# Patient Record
(demographics unavailable — no encounter records)

---

## 2024-10-08 NOTE — HISTORY OF PRESENT ILLNESS
[de-identified] : 52-year-old RHD female presents for a follow-up of bilateral shoulder pain, right significantly worse than left. She had a left shoulder cortisone injection on 3/28/2024. She notes the left shoulder is functioning. She notes her left shoulder forward flexion is significantly better. She was told her left shoulder is about 90-95% better at this point. Patient denies that she has any numbness or tingling. She continues to have limited forward flexion, internal rotation and external rotation of her right shoulder.   Radiology Results 3/28/2024 o Right Shoulder : AP internal/external rotation and outlet views were obtained and revealed no significant degenerative arthritis no lytic lesions of the right shoulder  o Left Shoulder : AP internal/external rotation and outlet views were obtained and revealed no significant degenerative arthritis no lytic lesions of the left shoulder

## 2024-10-08 NOTE — DISCUSSION/SUMMARY
[de-identified] : I went over the pathophysiology of the patient's symptoms in great detail with the patient. I am recommending the patient continues to go to physical therapy to obtain increases in their strength and mobility. A prescription was provided. At-home strengthening exercises were discussed and demonstrated with the patient.  The patient was instructed in ROM exercises they are to do at home. We discussed the use of ice, Tylenol and anti-inflammatories to relieve pain.   All of their questions were answered. They understand and consent to the plan.   FU in 6 weeks.

## 2024-10-08 NOTE — PHYSICAL EXAM
[de-identified] : Constitutional o Appearance : well-nourished, well developed, alert, in no acute distress  Head and Face o Head :  Inspection : atraumatic, normocephalic o Face :  Inspection : no visible rash or discoloration Respiratory o Respiratory Effort: breathing unlabored  Neurologic o Sensation : Normal sensation  Psychiatric o Mood and Affect: mood normal, affect appropriate  Lymphatic o Additional Nodes : No palpable lymph nodes present   Cervical Spine o Inspection/Palpation :  Inspection : alignment midline, normal degree of lordosis present  Skin : normal appearance, no masses or tenderness, trachea midline  Palpation : musculature is nontender to palpation o Range of Motion: slight limited rotation to the left,  o Tests: Negative Spurlings test  Right Upper Extremity o Right Shoulder :  Inspection/Palpation : min anterior capsular  tenderness, no swelling or deformities  Range of Motion : forward flexion to 120, ER to 20 degrees, abduction 85 degrees, IR to the posterior spine no crepitance,   Strength : forward elevation 4+/5, IR 4/5, ER 4/5, ER at 90 of abduction 4/5, supraspinatus 4/5, adduction 5/5, abduction 5/5, biceps/triceps 5/5,  5/5  Stability : no joint instability on provocative testing   Tests: Perez negative, Neer negative, Nic negative, drop arm test negative, Fleming's test negative  Left Upper Extremity o Left Shoulder :  Inspection/Palpation : mild anterior capsular tenderness, no swelling or deformities  Range of Motion : limited rotation, forward flexion symmetrical, full ER  no crepitance  Strength : forward elevation 5/5, IR 5/5, ER 5/5, ER at 90 of abduction 5/5, supraspinatus 5/5, adduction 5/5, abduction 5/5, biceps/triceps 5/5,  5/5  Stability : no joint instability on provocative testing   Tests: Perez negative, Neer negative, Nic negative, drop arm test negative, Fleming's test negative  Gait and Station: Gait: gait normal, no significant extremity swelling or lymphedema, good proprioception and balance

## 2024-10-08 NOTE — ADDENDUM
[FreeTextEntry1] : I, YARELY STANFORD, acted solely as a scribe for Dr. Enrique Cook on this date 10/08/2024.  All medical record entries made by the Scribe were at my, Dr. Enrique Cook, direction and personally dictated by me on 10/08/2024. I have reviewed the chart and agree that the record accurately reflects my personal performance of the history, physical exam, assessment and plan. I have also personally directed, reviewed, and agreed with the chart.

## 2024-11-19 NOTE — HISTORY OF PRESENT ILLNESS
[de-identified] : 52-year-old RHD female presents for a follow-up of bilateral shoulder pain, right worse than left. She had a left shoulder cortisone injection on 3/28/2024. She notes the left shoulder is functioning. She notes her left shoulder forward flexion is significantly better. She has significant improved range of motion of her left shoulder which is pain free. She was told her left shoulder is about 90-95% better at this point. Patient denies that she has any numbness or tingling. She continues to have limited forward flexion, internal rotation and external rotation of her right shoulder and is not sure if she can do physical therapy at home at this point. She states she uses the pulley at home.   Radiology Results 3/28/2024 o Right Shoulder : AP internal/external rotation and outlet views were obtained and revealed no significant degenerative arthritis no lytic lesions of the right shoulder  o Left Shoulder : AP internal/external rotation and outlet views were obtained and revealed no significant degenerative arthritis no lytic lesions of the left shoulder

## 2024-11-19 NOTE — ADDENDUM
[FreeTextEntry1] : I, YARELY STANFORD, acted solely as a scribe for Dr. Enrique Cook on this date 11/19/2024.  All medical record entries made by the Scribe were at my, Dr. Enrique Cook, direction and personally dictated by me on 11/19/2024. I have reviewed the chart and agree that the record accurately reflects my personal performance of the history, physical exam, assessment and plan. I have also personally directed, reviewed, and agreed with the chart.

## 2024-11-19 NOTE — DISCUSSION/SUMMARY
[de-identified] : I went over the pathophysiology of the patient's symptoms in great detail with the patient. I am recommending the patient continues to do physical therapy at home to obtain increases in their strength and mobility. A prescription was provided. At-home strengthening exercises were discussed and demonstrated with the patient.  The patient was instructed in ROM exercises they are to do at home. We discussed the use of ice, Tylenol and anti-inflammatories to relieve pain. She should continue to use an over-the-door pulley.   All of their questions were answered. They understand and consent to the plan.   FU 4-6 weeks for re evaluation

## 2024-11-19 NOTE — PHYSICAL EXAM
[de-identified] : Constitutional o Appearance : well-nourished, well developed, alert, in no acute distress  Head and Face o Head :  Inspection : atraumatic, normocephalic o Face :  Inspection : no visible rash or discoloration Respiratory o Respiratory Effort: breathing unlabored  Neurologic o Sensation : Normal sensation  Psychiatric o Mood and Affect: mood normal, affect appropriate  Lymphatic o Additional Nodes : No palpable lymph nodes present   Cervical Spine o Inspection/Palpation :  Inspection : alignment midline, normal degree of lordosis present  Skin : normal appearance, no masses or tenderness, trachea midline  Palpation : musculature is nontender to palpation o Range of Motion: slight limited rotation to the left,  o Tests: Negative Spurlings test  Right Upper Extremity o Right Shoulder :  Inspection/Palpation : no anterior capsular  tenderness, no swelling or deformities  Range of Motion : forward flexion to 120, lacks 15 degrees, ER full, abduction 85 degrees, IR passive motion, abduction is 90 degrees,  no crepitance,   Strength : forward elevation 5/5, IR 5/5, ER 4/5, ER at 90 of abduction 5/5, supraspinatus 5/5, adduction 5/5, abduction 5/5, biceps/triceps 5/5,  5/5  Stability : no joint instability on provocative testing   Tests: Perez negative, Neer negative, Nic negative, drop arm test negative, [place]'s test negative  Left Upper Extremity o Left Shoulder :  Inspection/Palpation : mild anterior capsular tenderness, no swelling or deformities  Range of Motion : full forward flexion full ER and IR no crepitance  Strength : forward elevation 5/5, IR 5/5, ER 5/5, ER at 90 of abduction 5/5, supraspinatus 5/5, adduction 5/5, abduction 5/5, biceps/triceps 5/5,  5/5  Stability : no joint instability on provocative testing   Tests: Perez negative, Neer negative, Nic negative, drop arm test negative, [place]'s test negative  Gait and Station: Gait: gait normal, no significant extremity swelling or lymphedema, good proprioception and balance

## 2024-12-30 NOTE — ADDENDUM
[FreeTextEntry1] : I, YARELY STANFORD, acted solely as a scribe for Dr. Enrique Cook on this date 12/30/2024.  All medical record entries made by the Scribe were at my, Dr. Enrique Cook, direction and personally dictated by me on 12/30/2024. I have reviewed the chart and agree that the record accurately reflects my personal performance of the history, physical exam, assessment and plan. I have also personally directed, reviewed, and agreed with the chart.

## 2024-12-30 NOTE — HISTORY OF PRESENT ILLNESS
[de-identified] : 52-year-old RHD female presents for a follow-up of bilateral shoulder pain, right worse than left. She had a left shoulder cortisone injection on 3/28/2024. She notes the left shoulder is functioning. She notes her left shoulder forward flexion is significantly better. She has significant improved range of motion of her left shoulder which is pain free. She was told her left shoulder is about 99% better at this point. Patient denies that she has any numbness or tingling. She continues to have limited forward flexion, internal rotation and external rotation of her right shoulder. She has finished with phyiscal therapy for the year. She states she uses the pulley at home.   Radiology Results 3/28/2024 o Right Shoulder : AP internal/external rotation and outlet views were obtained and revealed no significant degenerative arthritis no lytic lesions of the right shoulder  o Left Shoulder : AP internal/external rotation and outlet views were obtained and revealed no significant degenerative arthritis no lytic lesions of the left shoulder

## 2024-12-30 NOTE — DISCUSSION/SUMMARY
[de-identified] : I went over the pathophysiology of the patient's symptoms in great detail with the patient. I am recommending the patient continues to do physical therapy at home to obtain increases in their strength and mobility. A prescription was provided. At-home strengthening exercises were discussed and demonstrated with the patient.  The patient was instructed in ROM exercises they are to do at home. We discussed the use of ice, Tylenol and anti-inflammatories to relieve pain. She should continue to use an over-the-door pulley.   All of their questions were answered. They understand and consent to the plan.   FU 4-6 weeks.

## 2024-12-30 NOTE — PHYSICAL EXAM
[de-identified] : Constitutional o Appearance : well-nourished, well developed, alert, in no acute distress  Head and Face o Head :  Inspection : atraumatic, normocephalic o Face :  Inspection : no visible rash or discoloration Respiratory o Respiratory Effort: breathing unlabored  Neurologic o Sensation : Normal sensation  Psychiatric o Mood and Affect: mood normal, affect appropriate  Lymphatic o Additional Nodes : No palpable lymph nodes present   Cervical Spine o Inspection/Palpation :  Inspection : alignment midline, normal degree of lordosis present  Skin : normal appearance, no masses or tenderness, trachea midline  Palpation : musculature is nontender to palpation o Range of Motion: slight limited rotation to the left,  o Tests: Negative Spurlings test  Right Upper Extremity o Right Shoulder :  Inspection/Palpation : no anterior capsular  tenderness, no swelling or deformities  Range of Motion : forward flexion to 120, lacks 15 degrees, ER full, abduction 85 degrees, IR limited by 20 degrees, abduction is 90 degrees,  no crepitance,   Strength : forward elevation 5/5, IR 5/5, ER 5/5, ER at 90 of abduction 5/5, supraspinatus 5/5, adduction 5/5, abduction 5/5, biceps/triceps 5/5,  5/5  Stability : no joint instability on provocative testing   Tests: Perez negative, Neer negative, Nic negative, drop arm test negative, [place]'s test negative  Left Upper Extremity o Left Shoulder :  Inspection/Palpation : mild anterior capsular tenderness, no swelling or deformities  Range of Motion : full forward flexion full ER and IR no crepitance  Strength : forward elevation 5/5, IR 5/5, ER 5/5, ER at 90 of abduction 5/5, supraspinatus 5/5, adduction 5/5, abduction 5/5, biceps/triceps 5/5,  5/5  Stability : no joint instability on provocative testing   Tests: Perez negative, Neer negative, Nic negative, drop arm test negative, [place]'s test negative  Gait and Station: Gait: gait normal, no significant extremity swelling or lymphedema, good proprioception and balance

## 2025-02-07 NOTE — HISTORY OF PRESENT ILLNESS
[FreeTextEntry1] : 52 year old  female presents for annual. Last seen in May 2022. Had negative pap in 2021. Has 3 prior c/s. H/o endometrial polyp. Reports periods are irregular. Denies menopausal sxs. Notes her mother passed away in 2024 from lymphoma and also had a breast melanoma. Last mammo was in May 2024. Had colonoscopy in .  Her oldest daughter is applying to college this year  OBHx: c/s x3 GYNHx: h/o endometrial polyp  PSHx: c/s   FHx: lymphoma-mother, breast melanoma-mother  Allergies: Adhesive tape, NKDA [Mammogramdate] : 5/2024 [PapSmeardate] : 2/2021 [ColonoscopyDate] : 2022

## 2025-02-07 NOTE — PLAN
[FreeTextEntry1] :  52 year old  female presents for annual.  -Pap/HPV -Referred for mammo(due in May) -Colonoscopy UTD(due for repeat in ) -Discussed perimenopause  F/u in 1 yr.

## 2025-02-07 NOTE — END OF VISIT
[FreeTextEntry3] :  I, Ena Maldonado, acted as a scribe on behalf of Dr. Yady Caban M.D. on 02/07/2025.   All medical entries made by the scribe were at my, Dr. Yady Caban M.D., direction and personally dictated by me on 02/07/2025. I have reviewed the chart and agree that the record accurately reflects my personal performance of the history, physical exam, assessment and plan. I have also personally directed, reviewed, and agreed with the chart.

## 2025-02-10 NOTE — PHYSICAL EXAM
[de-identified] : Constitutional o Appearance : well-nourished, well developed, alert, in no acute distress  Head and Face o Head :  Inspection : atraumatic, normocephalic o Face :  Inspection : no visible rash or discoloration Respiratory o Respiratory Effort: breathing unlabored  Neurologic o Sensation : Normal sensation  Psychiatric o Mood and Affect: mood normal, affect appropriate  Lymphatic o Additional Nodes : No palpable lymph nodes present   Cervical Spine o Inspection/Palpation :  Inspection : alignment midline, normal degree of lordosis present  Skin : normal appearance, no masses or tenderness, trachea midline  Palpation : musculature is nontender to palpation o Range of Motion: slight limited rotation to the left,  o Tests: Negative Spurlings test  Right Upper Extremity o Right Shoulder :  Inspection/Palpation : no anterior capsular  tenderness, no swelling or deformities  Range of Motion : forward flexion to 120, lacks 5 degrees, ER full, abduction 85 degrees, IR limited by 20 degrees, abduction is 90 degrees,  no crepitance,  Lacks 5 degrees of  full forward flexion   Strength : forward elevation 5/5, IR 5/5, ER 5/5, ER at 90 of abduction 5/5, supraspinatus 5/5, adduction 5/5, abduction 5/5, biceps/triceps 5/5,  5/5  Stability : no joint instability on provocative testing   Tests: Perez negative, Neer negative, Nic negative, drop arm test negative, Rankin's test negative Cross chest adduction causes no pain   Left Upper Extremity o Left Shoulder :  Inspection/Palpation : mild anterior capsular tenderness, no swelling or deformities  Range of Motion : full forward flexion full ER and IR no crepitance. Full abduction   Strength : forward elevation 5/5, IR 5/5, ER 5/5, ER at 90 of abduction 5/5, supraspinatus 5/5, adduction 5/5, abduction 5/5, biceps/triceps 5/5,  5/5  Stability : no joint instability on provocative testing   Tests: Perez negative, Neer negative, Nic negative, drop arm test negative, Rankin's test negative Cross chest adduction causes no pain  Gait and Station: Gait: gait normal, no significant extremity swelling or lymphedema, good proprioception and balance

## 2025-02-10 NOTE — HISTORY OF PRESENT ILLNESS
[de-identified] : 52-year-old RHD female presents for a follow-up of bilateral shoulder pain, right worse than left. History of frozen shoulder. Last visit to [facility] therapy was last Weds. She had a left shoulder cortisone injection on 3/28/2024. She notes the left shoulder is functioning. She notes her left shoulder forward flexion is significantly better. She has significant improved range of motion of her left shoulder which is pain free. She was told her left shoulder is about 99% better at this point. Patient denies that she has any numbness or tingling. She continues to have limited forward flexion, internal rotation and external rotation of her right shoulder.   o Right Shoulder : AP internal/external rotation and outlet views were obtained and revealed no significant degenerative arthritis no lytic lesions of the right shoulder  o Left Shoulder : AP internal/external rotation and outlet views were obtained and revealed no significant degenerative arthritis no lytic lesions of the left shoulder  No

## 2025-02-10 NOTE — DISCUSSION/SUMMARY
[de-identified] :  The patient was instructed in ROM exercises they are to do at home. We discussed the use of ice, Tylenol and anti-inflammatories to relieve pain. She should continue to use an over-the-door pulley, forward flexion wall walks.   All of their questions were answered. They understand and consent to the plan.  Follow up as needed

## 2025-07-08 NOTE — PAST MEDICAL HISTORY
[FreeTextEntry1] : Reviewed.   PCP: Dariana Middleton ([facility])  Hx of TBI: denied Hx of Seizures: denied Hx of Migraine HA: denied   Relevant Labs/EKG: none in chart for review

## 2025-07-08 NOTE — HISTORY OF PRESENT ILLNESS
[FreeTextEntry1] : Refer to intake documentation from 6/5/25 for details.   [FreeTextEntry2] : Refer to intake documentation from 6/5/25 for details.   [FreeTextEntry3] : Current psych meds: --sertraline 150 mg daily   Past psych meds: --denied

## 2025-07-08 NOTE — PLAN
[FreeTextEntry5] : 1. Unspecified Depressive Disorder --Continue sertraline 150 mg daily  --Internal referral for psychotherapy  --Follow-up in 8 weeks or sooner PRN   -[place]  reviewed prior to all controlled substance prescriptions. - I have given my usual talk about the risks and benefits of all treatment recommendations including alternatives and the risks and benefits of no treatment at all. Patient had the opportunity to have all questions answered to their satisfaction. They expressed understanding and agreement with the above treatment plan. - Educated patient of importance of remaining abstinent from drugs and alcohol. - Discussed that unpredictable effects including cardiorespiratory collapse can result from the combination of illicit drugs and prescribed medications. Patient verbalized understanding. - Emergency procedures were discussed: pt. educated to call 911 or go to nearest ER for worsening of symptoms/suicidal/homicidal ideation. - Patient given opportunity to ask questions and their questions were answered and they expressed understanding and agreement with above plan.   I spent a total of 30 minutes for today's visit in evaluating and treating the patient as per above, including preparing for patient's appointment (review of prior documents, [facility] ), performing a medically necessary exam/evaluation, communicating/counseling/educating the patient ordering medications, documenting clinical information in the EHR.

## 2025-07-08 NOTE — DISCUSSION/SUMMARY
[FreeTextEntry1] : 6/5/25- Continue sertraline 150 mg daily. Follow-up in 4-6 weeks.  7/8/25- Continue sertraline 150 mg daily. Follow-up in 8 weeks.